# Patient Record
Sex: MALE | Race: WHITE | Employment: OTHER | URBAN - METROPOLITAN AREA
[De-identification: names, ages, dates, MRNs, and addresses within clinical notes are randomized per-mention and may not be internally consistent; named-entity substitution may affect disease eponyms.]

---

## 2020-01-24 ENCOUNTER — HOSPITAL ENCOUNTER (EMERGENCY)
Age: 60
Discharge: HOME | End: 2020-01-24
Attending: EMERGENCY MEDICINE
Payer: COMMERCIAL

## 2020-01-24 ENCOUNTER — APPOINTMENT (OUTPATIENT)
Dept: GENERAL RADIOLOGY | Age: 60
End: 2020-01-24
Payer: COMMERCIAL

## 2020-01-24 VITALS
BODY MASS INDEX: 27.35 KG/M2 | TEMPERATURE: 97.9 F | WEIGHT: 191 LBS | OXYGEN SATURATION: 95 % | DIASTOLIC BLOOD PRESSURE: 83 MMHG | HEIGHT: 70 IN | SYSTOLIC BLOOD PRESSURE: 142 MMHG | RESPIRATION RATE: 17 BRPM | HEART RATE: 83 BPM

## 2020-01-24 LAB
ANION GAP SERPL CALCULATED.3IONS-SCNC: 12 MMOL/L (ref 7–16)
BUN BLDV-MCNC: 16 MG/DL (ref 6–20)
CALCIUM SERPL-MCNC: 9.4 MG/DL (ref 8.6–10.2)
CHLORIDE BLD-SCNC: 101 MMOL/L (ref 98–107)
CO2: 25 MMOL/L (ref 22–29)
CREAT SERPL-MCNC: 1.3 MG/DL (ref 0.7–1.2)
EKG ATRIAL RATE: 79 BPM
EKG P AXIS: 6 DEGREES
EKG P-R INTERVAL: 130 MS
EKG Q-T INTERVAL: 380 MS
EKG QRS DURATION: 84 MS
EKG QTC CALCULATION (BAZETT): 435 MS
EKG R AXIS: 13 DEGREES
EKG T AXIS: -9 DEGREES
EKG VENTRICULAR RATE: 79 BPM
GFR AFRICAN AMERICAN: >60
GFR NON-AFRICAN AMERICAN: 57 ML/MIN/1.73
GLUCOSE BLD-MCNC: 181 MG/DL (ref 74–99)
HCT VFR BLD CALC: 42.1 % (ref 37–54)
HEMOGLOBIN: 14.1 G/DL (ref 12.5–16.5)
MCH RBC QN AUTO: 30 PG (ref 26–35)
MCHC RBC AUTO-ENTMCNC: 33.5 % (ref 32–34.5)
MCV RBC AUTO: 89.6 FL (ref 80–99.9)
PDW BLD-RTO: 12.3 FL (ref 11.5–15)
PLATELET # BLD: 212 E9/L (ref 130–450)
PMV BLD AUTO: 10.5 FL (ref 7–12)
POTASSIUM SERPL-SCNC: 3.5 MMOL/L (ref 3.5–5)
RBC # BLD: 4.7 E12/L (ref 3.8–5.8)
SODIUM BLD-SCNC: 138 MMOL/L (ref 132–146)
TROPONIN: <0.01 NG/ML (ref 0–0.03)
WBC # BLD: 7.4 E9/L (ref 4.5–11.5)

## 2020-01-24 PROCEDURE — 84484 ASSAY OF TROPONIN QUANT: CPT

## 2020-01-24 PROCEDURE — 99285 EMERGENCY DEPT VISIT HI MDM: CPT

## 2020-01-24 PROCEDURE — 85027 COMPLETE CBC AUTOMATED: CPT

## 2020-01-24 PROCEDURE — 93005 ELECTROCARDIOGRAM TRACING: CPT | Performed by: EMERGENCY MEDICINE

## 2020-01-24 PROCEDURE — 36415 COLL VENOUS BLD VENIPUNCTURE: CPT

## 2020-01-24 PROCEDURE — 80048 BASIC METABOLIC PNL TOTAL CA: CPT

## 2020-01-24 PROCEDURE — 71045 X-RAY EXAM CHEST 1 VIEW: CPT

## 2020-01-24 RX ORDER — LORAZEPAM 0.5 MG/1
1 TABLET ORAL EVERY 6 HOURS PRN
COMMUNITY

## 2020-01-24 ASSESSMENT — ENCOUNTER SYMPTOMS
DIARRHEA: 0
ABDOMINAL PAIN: 0
EYE DISCHARGE: 0
COUGH: 0
EYE PAIN: 0
SORE THROAT: 0
EYE REDNESS: 0
SHORTNESS OF BREATH: 0
WHEEZING: 0
NAUSEA: 0
VOMITING: 0
BACK PAIN: 0
SINUS PRESSURE: 0

## 2020-01-24 NOTE — ED PROVIDER NOTES
Normal breath sounds. No stridor. No wheezing, rhonchi or rales. Abdominal:      General: Bowel sounds are normal. There is no distension. Palpations: Abdomen is soft. Tenderness: There is no tenderness. There is no guarding. Musculoskeletal: Normal range of motion. General: No swelling. Skin:     General: Skin is warm and dry. Findings: No rash. Neurological:      Mental Status: He is alert and oriented to person, place, and time. Procedures     MDM         EKG:  Normal sinus rhythm with ventricular rate of 79 and frequent PVC's. [place] interval, QRS duration and QT interval within normal range. Normal axis. Nonspecific ST segment and T wave changes. No previous EKG.         --------------------------------------------- PAST HISTORY ---------------------------------------------  Past Medical History:  has no past medical history on file. Past Surgical History:  has no past surgical history on file. Social History:      Family History: family history is not on file. The patients home medications have been reviewed. Allergies: Patient has no known allergies.     -------------------------------------------------- RESULTS -------------------------------------------------  Labs:  Results for orders placed or performed during the hospital encounter of 26/56/02   Basic metabolic panel   Result Value Ref Range    Sodium 138 132 - 146 mmol/L    Potassium 3.5 3.5 - 5.0 mmol/L    Chloride 101 98 - 107 mmol/L    CO2 25 22 - 29 mmol/L    Anion Gap 12 7 - 16 mmol/L    Glucose 181 (H) 74 - 99 mg/dL    BUN 16 6 - 20 mg/dL    CREATININE 1.3 (H) 0.7 - 1.2 mg/dL    GFR Non-African American 57 >=60 mL/min/1.73    GFR African American >60     Calcium 9.4 8.6 - 10.2 mg/dL   CBC   Result Value Ref Range    WBC 7.4 4.5 - 11.5 E9/L    RBC 4.70 3.80 - 5.80 E12/L    Hemoglobin 14.1 12.5 - 16.5 g/dL    Hematocrit 42.1 37.0 - 54.0 %    MCV 89.6 80.0 - 99.9 fL    MCH 30.0 26.0 - 35.0 pg    MCHC Premature ventricular contractions        Disposition:  Patient's disposition: Discharge to home  Patient's condition is stable.                Evonne Hathaway DO  01/24/20 0392

## 2020-02-05 ENCOUNTER — HOSPITAL ENCOUNTER (OUTPATIENT)
Age: 60
Discharge: HOME | End: 2020-02-07
Payer: COMMERCIAL

## 2020-02-05 LAB
ALBUMIN SERPL-MCNC: 4.9 G/DL (ref 3.5–5.2)
ALP BLD-CCNC: 48 U/L (ref 40–129)
ALT SERPL-CCNC: 17 U/L (ref 0–40)
ANION GAP SERPL CALCULATED.3IONS-SCNC: 14 MMOL/L (ref 7–16)
AST SERPL-CCNC: 22 U/L (ref 0–39)
BILIRUB SERPL-MCNC: 0.3 MG/DL (ref 0–1.2)
BUN BLDV-MCNC: 14 MG/DL (ref 6–20)
CALCIUM SERPL-MCNC: 9.9 MG/DL (ref 8.6–10.2)
CHLORIDE BLD-SCNC: 102 MMOL/L (ref 98–107)
CO2: 23 MMOL/L (ref 22–29)
CREAT SERPL-MCNC: 1.3 MG/DL (ref 0.7–1.2)
GFR AFRICAN AMERICAN: >60
GFR NON-AFRICAN AMERICAN: 57 ML/MIN/1.73
GLUCOSE BLD-MCNC: 156 MG/DL (ref 74–99)
HBA1C MFR BLD: 5.6 % (ref 4–5.6)
POTASSIUM SERPL-SCNC: 4.5 MMOL/L (ref 3.5–5)
PROSTATE SPECIFIC ANTIGEN: 0.58 NG/ML (ref 0–4)
SODIUM BLD-SCNC: 139 MMOL/L (ref 132–146)
T3 FREE: 2.9 PG/ML (ref 2–4.4)
T3 TOTAL: 88.74 NG/DL (ref 80–200)
T3 UPTAKE PERCENT: 33.2 % (ref 22.5–37)
T4 FREE: 1.38 NG/DL (ref 0.93–1.7)
T4 TOTAL: 6.6 MCG/DL (ref 4.5–11.7)
TOTAL PROTEIN: 7.8 G/DL (ref 6.4–8.3)
TSH SERPL DL<=0.05 MIU/L-ACNC: 1.57 UIU/ML (ref 0.27–4.2)

## 2020-02-05 PROCEDURE — 80053 COMPREHEN METABOLIC PANEL: CPT

## 2020-02-05 PROCEDURE — 84479 ASSAY OF THYROID (T3 OR T4): CPT

## 2020-02-05 PROCEDURE — 83036 HEMOGLOBIN GLYCOSYLATED A1C: CPT

## 2020-02-05 PROCEDURE — 84439 ASSAY OF FREE THYROXINE: CPT

## 2020-02-05 PROCEDURE — G0103 PSA SCREENING: HCPCS

## 2020-02-05 PROCEDURE — 84443 ASSAY THYROID STIM HORMONE: CPT

## 2020-02-05 PROCEDURE — 84481 FREE ASSAY (FT-3): CPT

## 2020-02-08 NOTE — PROGRESS NOTES
[facility] Cardiology consult  Dr. Alejandra Pierce      Reason for Consult: Post ER  Referring Physician: No primary care provider on file. CHIEF COMPLAINT:   Chief Complaint   Patient presents with    Palpitations     post ER. Pt was in ER for Palpitations. No complains since. HISTORY OF PRESENT ILLNESS:   Patient is 63years old male with no significant past medical history, had palpitations, patient is here for further evaluation. Patient stated that couple of weeks ago he had an episode of palpitations that lasted for a couple of days, off and on, no aggravating or alleviating factors, no associated symptoms, symptoms prompted him to seek medical attention at the emergency room where he was diagnosed with a PVCs, patient stated for the last few days he did not have any palpitations, palpitations happen at rest, better with exertion, denies any lightheadedness or dizziness, no chest pain, no shortness of breath, no pedal edema, no PND, no orthopnea, no syncope, no presyncopal episodes. Patient is physically active here, he does a spinning bike classes for at least 45 minutes almost every day without any cardiac complaints      History reviewed. No pertinent past medical history. Past Surgical History:   Procedure Laterality Date    EYE SURGERY Left          Current Outpatient Medications   Medication Sig Dispense Refill    LORazepam (ATIVAN) 0.5 MG tablet Take 0.5 mg by mouth every 6 hours as needed for Anxiety. No current facility-administered medications for this visit.           Allergies as of 02/11/2020    (No Known Allergies)       Social History     Socioeconomic History    Marital status: Single     Spouse name: Not on file    Number of children: Not on file    Years of education: Not on file    Highest education level: Not on file   Occupational History    Not on file   Social Needs    Financial resource strain: Not on file    Food insecurity:     Worry: Not on file Inability: Not on file    Transportation needs:     Medical: Not on file     Non-medical: Not on file   Tobacco Use    Smoking status: Never Smoker    Smokeless tobacco: Never Used   Substance and Sexual Activity    Alcohol use: Yes     Comment: occasional.     Drug use: Never    Sexual activity: Not on file   Lifestyle    Physical activity:     Days per week: Not on file     Minutes per session: Not on file    Stress: Not on file   Relationships    Social connections:     Talks on phone: Not on file     Gets together: Not on file     Attends Mosque service: Not on file     Active member of club or organization: Not on file     Attends meetings of clubs or organizations: Not on file     Relationship status: Not on file    Intimate partner violence:     Fear of current or ex partner: Not on file     Emotionally abused: Not on file     Physically abused: Not on file     Forced sexual activity: Not on file   Other Topics Concern    Not on file   Social History Narrative    Not on file       Family History   Problem Relation Age of Onset    High Blood Pressure Mother     Heart Failure Father     Heart Surgery Father        REVIEW OF SYSTEMS:     CONSTITUTIONAL:  negative for  fevers, chills, sweats and fatigue  EYES:  negative for  double vision, blurred vision and blind spots  HEENT:  negative for  tinnitus, earaches, nasal congestion and epistaxis  RESPIRATORY:  negative for  dry cough, cough with sputum, dyspnea, wheezing and hemoptysis  CARDIOVASCULAR: as per HPI  GASTROINTESTINAL:  negative for nausea, vomiting, diarrhea, constipation, pruritus and jaundice  GENITOURINARY:  negative for frequency, dysuria, nocturia, urinary incontinence and hesitancy  HEMATOLOGIC/LYMPHATIC:  negative for easy bruising, bleeding, lymphadenopathy and petechiae  ALLERGIC/IMMUNOLOGIC:  negative for urticaria, hay fever and angioedema  ENDOCRINE:  negative for heat intolerance, cold intolerance, tremor, hair loss and diabetic symptoms including neither polyuria nor polydipsia nor blurred vision  MUSCULOSKELETAL:  negative for  myalgias, arthralgias, joint swelling, stiff joints and decreased range of motion  NEUROLOGICAL:  negative for memory problems, speech problems, visual disturbance, dysphagia, weakness and numbness      PHYSICAL EXAM:   CONSTITUTIONAL:  awake, alert, cooperative, no apparent distress, and appears stated age  EYES:  lids and lashes normal and pupils equal, round and reactive to light, anicteric sclerae  HEAD:  normocepalic, without obvious abnormality, atraumatic, pink, moist mucous membranes. NECK:  Supple, symmetrical, trachea midline, no adenopathy, thyroid symmetric, not enlarged and no tenderness, skin normal  HEMATOLOGIC/LYMPHATICS:  no cervical lymphadenopathy and no supraclavicular lymphadenopathy  LUNGS:  No increased work of breathing, good air exchange, clear to auscultation bilaterally, no crackles or wheezing  CARDIOVASCULAR:  Normal apical impulse, regular rate and rhythm, normal S1 and S2, no S3 or S4, and no murmur noted and no JVD, no carotid bruit, no pedal edema, good carotid upstroke bilaterally. ABDOMEN:  Soft, nontender, no masses, no hepatomegaly or splenomegaly, BS+  CHEST: nontender to palpation, expands symmetrically  MUSCULOSKELETAL:  No clubbing no cyanosis. there is no redness, warmth, or swelling of the joints  full range of motion noted  NEUROLOGIC:  Alert, awake,oriented x3, no focal neurologic deficit was appreciated  SKIN:  no bruising or bleeding, normal skin color, texture, turgor and no redness, warmth, or swelling        /70 (Site: Right Upper Arm, Position: Sitting, Cuff Size: Large Adult)   Pulse 70   Ht 5' 10\" (1.778 m)   Wt 196 lb (88.9 kg)   BMI 28.12 kg/m²     DATA:  I personally reviewed the visit EKG with the following interpretation: Sinus rhythm nonspecific T wave changes      EKG 1/24/20 Sinus rhythm with occasional premature ventricular

## 2020-02-11 ENCOUNTER — OFFICE VISIT (OUTPATIENT)
Dept: CARDIOLOGY CLINIC | Age: 60
End: 2020-02-11
Payer: COMMERCIAL

## 2020-02-11 VITALS
HEART RATE: 70 BPM | BODY MASS INDEX: 28.06 KG/M2 | WEIGHT: 196 LBS | DIASTOLIC BLOOD PRESSURE: 70 MMHG | SYSTOLIC BLOOD PRESSURE: 130 MMHG | HEIGHT: 70 IN

## 2020-02-11 PROCEDURE — 99203 OFFICE O/P NEW LOW 30 MIN: CPT | Performed by: INTERNAL MEDICINE

## 2020-02-11 PROCEDURE — 93000 ELECTROCARDIOGRAM COMPLETE: CPT | Performed by: INTERNAL MEDICINE

## 2020-11-20 ENCOUNTER — HOSPITAL ENCOUNTER (EMERGENCY)
Age: 60
Discharge: HOME | End: 2020-11-20
Attending: EMERGENCY MEDICINE
Payer: COMMERCIAL

## 2020-11-20 ENCOUNTER — APPOINTMENT (OUTPATIENT)
Dept: GENERAL RADIOLOGY | Age: 60
End: 2020-11-20
Payer: COMMERCIAL

## 2020-11-20 VITALS
BODY MASS INDEX: 25.77 KG/M2 | WEIGHT: 180 LBS | TEMPERATURE: 98 F | RESPIRATION RATE: 18 BRPM | HEIGHT: 70 IN | SYSTOLIC BLOOD PRESSURE: 158 MMHG | OXYGEN SATURATION: 98 % | DIASTOLIC BLOOD PRESSURE: 86 MMHG | HEART RATE: 64 BPM

## 2020-11-20 LAB
ANION GAP SERPL CALCULATED.3IONS-SCNC: 11 MMOL/L (ref 7–16)
BASOPHILS ABSOLUTE: 0.04 E9/L (ref 0–0.2)
BASOPHILS RELATIVE PERCENT: 0.5 % (ref 0–2)
BUN BLDV-MCNC: 13 MG/DL (ref 6–20)
CALCIUM SERPL-MCNC: 10 MG/DL (ref 8.6–10.2)
CHLORIDE BLD-SCNC: 102 MMOL/L (ref 98–107)
CO2: 26 MMOL/L (ref 22–29)
CREAT SERPL-MCNC: 1.3 MG/DL (ref 0.7–1.2)
EKG ATRIAL RATE: 69 BPM
EKG P AXIS: 60 DEGREES
EKG P-R INTERVAL: 166 MS
EKG Q-T INTERVAL: 362 MS
EKG QRS DURATION: 78 MS
EKG QTC CALCULATION (BAZETT): 387 MS
EKG R AXIS: 31 DEGREES
EKG T AXIS: 18 DEGREES
EKG VENTRICULAR RATE: 69 BPM
EOSINOPHILS ABSOLUTE: 0.04 E9/L (ref 0.05–0.5)
EOSINOPHILS RELATIVE PERCENT: 0.5 % (ref 0–6)
GFR AFRICAN AMERICAN: >60
GFR NON-AFRICAN AMERICAN: 56 ML/MIN/1.73
GLUCOSE BLD-MCNC: 125 MG/DL (ref 74–99)
HCT VFR BLD CALC: 45.9 % (ref 37–54)
HEMOGLOBIN: 15.4 G/DL (ref 12.5–16.5)
IMMATURE GRANULOCYTES #: 0.01 E9/L
IMMATURE GRANULOCYTES %: 0.1 % (ref 0–5)
LYMPHOCYTES ABSOLUTE: 1.74 E9/L (ref 1.5–4)
LYMPHOCYTES RELATIVE PERCENT: 23.8 % (ref 20–42)
MCH RBC QN AUTO: 29.7 PG (ref 26–35)
MCHC RBC AUTO-ENTMCNC: 33.6 % (ref 32–34.5)
MCV RBC AUTO: 88.6 FL (ref 80–99.9)
MONOCYTES ABSOLUTE: 0.6 E9/L (ref 0.1–0.95)
MONOCYTES RELATIVE PERCENT: 8.2 % (ref 2–12)
NEUTROPHILS ABSOLUTE: 4.88 E9/L (ref 1.8–7.3)
NEUTROPHILS RELATIVE PERCENT: 66.9 % (ref 43–80)
PDW BLD-RTO: 12.7 FL (ref 11.5–15)
PLATELET # BLD: 235 E9/L (ref 130–450)
PMV BLD AUTO: 10.9 FL (ref 7–12)
POTASSIUM SERPL-SCNC: 4.5 MMOL/L (ref 3.5–5)
RBC # BLD: 5.18 E12/L (ref 3.8–5.8)
SODIUM BLD-SCNC: 139 MMOL/L (ref 132–146)
T4 TOTAL: 7.7 MCG/DL (ref 4.5–11.7)
TROPONIN: <0.01 NG/ML (ref 0–0.03)
TSH SERPL DL<=0.05 MIU/L-ACNC: 1.57 UIU/ML (ref 0.27–4.2)
WBC # BLD: 7.3 E9/L (ref 4.5–11.5)

## 2020-11-20 PROCEDURE — 80048 BASIC METABOLIC PNL TOTAL CA: CPT

## 2020-11-20 PROCEDURE — 6370000000 HC RX 637 (ALT 250 FOR IP): Performed by: STUDENT IN AN ORGANIZED HEALTH CARE EDUCATION/TRAINING PROGRAM

## 2020-11-20 PROCEDURE — 84443 ASSAY THYROID STIM HORMONE: CPT

## 2020-11-20 PROCEDURE — 93010 ELECTROCARDIOGRAM REPORT: CPT | Performed by: INTERNAL MEDICINE

## 2020-11-20 PROCEDURE — 99283 EMERGENCY DEPT VISIT LOW MDM: CPT

## 2020-11-20 PROCEDURE — 84484 ASSAY OF TROPONIN QUANT: CPT

## 2020-11-20 PROCEDURE — 93005 ELECTROCARDIOGRAM TRACING: CPT | Performed by: PHYSICIAN ASSISTANT

## 2020-11-20 PROCEDURE — 36415 COLL VENOUS BLD VENIPUNCTURE: CPT

## 2020-11-20 PROCEDURE — 71046 X-RAY EXAM CHEST 2 VIEWS: CPT

## 2020-11-20 PROCEDURE — 84436 ASSAY OF TOTAL THYROXINE: CPT

## 2020-11-20 PROCEDURE — 85025 COMPLETE CBC W/AUTO DIFF WBC: CPT

## 2020-11-20 RX ORDER — METOPROLOL SUCCINATE 25 MG/1
25 TABLET, EXTENDED RELEASE ORAL ONCE
Status: COMPLETED | OUTPATIENT
Start: 2020-11-20 | End: 2020-11-20

## 2020-11-20 RX ADMIN — METOPROLOL SUCCINATE 25 MG: 25 TABLET, EXTENDED RELEASE ORAL at 17:19

## 2020-11-20 ASSESSMENT — ENCOUNTER SYMPTOMS
SHORTNESS OF BREATH: 0
DIARRHEA: 0
ABDOMINAL PAIN: 0
SORE THROAT: 0
NAUSEA: 0
COUGH: 0
BACK PAIN: 0
VOMITING: 0
WHEEZING: 0
RHINORRHEA: 0

## 2020-11-20 NOTE — ED NOTES
FIRST PROVIDER CONTACT ASSESSMENT NOTE      Department of Emergency Medicine   ED  First Provider Note   11/20/20  1:07 PM EST    Chief Complaint: Palpitations (intermittent for several months, hx anxiety-worse last several days)      History of Present Illness:    Bebeto Brown is a 62 y.o. male who presents to the ED by private car for palpitations and also complaining of some chest pain last night. Patient states that he had the same issue in January and was diagnosed with \"PVCs. \"    Focused Screening Exam:  Constitutional:  Alert, appears stated age and is in no distress. *ALLERGIES*     Patient has no known allergies.      ED Triage Vitals   BP Temp Temp Source Pulse Resp SpO2 Height Weight   11/20/20 1304 11/20/20 1245 11/20/20 1245 11/20/20 1304 11/20/20 1304 11/20/20 1304 11/20/20 1304 11/20/20 1304   (!) 143/90 97.1 °F (36.2 °C) Tympanic 88 20 99 % 5' 10\" (1.778 m) 180 lb (81.6 kg)        Initial Plan of Care:  Initiate Treatment-Testing, Proceed toTreatment Area When Bed Available for ED Attending/MLP to Continue Care    -----------------[address] ASSESSMENT NOTE--------------  Electronically signed by Nathan Navarro PA-C   DD: 11/20/20       Nathan Navarro PA-C  11/20/20 1308

## 2020-11-20 NOTE — ED NOTES
Discharge instructions with Rx reviewed, patient verbalized understanding. Provided small snack for patient prior to leaving.           Teodoro Sultana RN  11/20/20 0933

## 2020-11-20 NOTE — ED PROVIDER NOTES
[facility]  Department of Emergency Medicine     Written by: Myra Gee DO  Patient Name: Cassi Onofre  Attending Provider: Ambreen att. providers found  Admit Date: 2020  1:19 PM  MRN: [id number]                   : 1960        Chief Complaint   Patient presents with    Palpitations     intermittent for several months, hx anxiety-worse last several days    - Chief complaint    The history is provided by the patient and medical records. Patient is a 69-year-old male who presents to the ED due to palpitations ongoing for the past x2 months. He does have a history of these, but before the last 2 months they were much more infrequent; he was seen in this ED in January of this year for the same symptoms, found to have frequent PVCs on EKG. Subsequently seen by Dr. Severiano Hoe (cardiology), his PVCs at that time were resolved, was told to call if symptoms recur so they could place him on Holter monitor for further evaluation; also placed on preventive cardiology and told to follow up in 6 months (or less if symptoms recurred). Patient denies any associated SOB, chest pain, dizziness, lightheadedness, nausea, or vomiting. Review of Systems   Constitutional: Negative for chills, fatigue and fever. HENT: Negative for congestion, rhinorrhea and sore throat. Eyes: Negative for visual disturbance. Respiratory: Negative for cough, shortness of breath and wheezing. Cardiovascular: Positive for palpitations. Negative for chest pain and leg swelling. Gastrointestinal: Negative for abdominal pain, diarrhea, nausea and vomiting. Genitourinary: Negative for dysuria and frequency. Musculoskeletal: Negative for back pain and myalgias. Skin: Negative for rash and wound. Neurological: Negative for dizziness, weakness, light-headedness and headaches. Psychiatric/Behavioral: Negative for confusion. Physical Exam  Vitals signs and nursing note reviewed. Constitutional:       General: He is not in acute distress. Appearance: He is not ill-appearing. HENT:      Head: Normocephalic and atraumatic. Right Ear: External ear normal.      Left Ear: External ear normal.      Nose: Nose normal. No rhinorrhea. Mouth/Throat:      Mouth: Mucous membranes are moist.      Pharynx: Oropharynx is clear. Eyes:      Extraocular Movements: Extraocular movements intact. Conjunctiva/sclera: Conjunctivae normal.      Pupils: Pupils are equal, round, and reactive to light. Neck:      Musculoskeletal: Normal range of motion and neck supple. Cardiovascular:      Rate and Rhythm: Normal rate. Rhythm irregular. Pulses: Normal pulses. Heart sounds: Normal heart sounds. Pulmonary:      Effort: Pulmonary effort is normal. No respiratory distress. Breath sounds: Normal breath sounds. No wheezing or rales. Abdominal:      General: Bowel sounds are normal.      Palpations: Abdomen is soft. Tenderness: There is no abdominal tenderness. There is no guarding. Musculoskeletal: Normal range of motion. General: No tenderness. Right lower leg: No edema. Left lower leg: No edema. Skin:     General: Skin is warm and dry. Capillary Refill: Capillary refill takes less than 2 seconds. Neurological:      General: No focal deficit present. Mental Status: He is alert and oriented to person, place, and time. Sensory: No sensory deficit. Psychiatric:         Mood and Affect: Mood normal.         Behavior: Behavior normal.          Procedures       MDM     52-year-old male presents to the ED due to chest palpitations x2 months, he does have a history the symptoms with PVCs on previous EKG but they are recently worsened and unremitting. Denies any chest pain or shortness of breath. Follows with Dr. Jordyn Ball. In the ED today patient is stable, no acute distress, does endorse current symptoms.   Exam does show an irregular rhythm, normal rate; patient also appears anxious but is pleasant, alert and oriented and answers questions appropriately. EKG showed sinus rhythm with frequent PVCs. Work-up including CXR, CBC, BMP, troponin, and thyroid studies was otherwise unremarkable. Patient did remain symptomatic throughout this encounter. Spoke with Dr. Ottoniel Carpenter (patient's cardiologist), discussed case, he recommends starting this patient on metoprolol and having him follow-up as an outpatient by calling the office on Monday. Discussed results and plan in their entirety with the patient, he is amenable and his questions were answered at this time. Patient remained hemodynamically stable throughout this encounter. He was made aware of signs/symptoms for which to return to the ER. I have discussed this patient with my attending, who has seen the patient and agrees with this disposition. Patient was seen and evaluated by myself and my attending No att. providers found. Assessment and Plan discussed with attending provider, please see attestation for final plan of care. ED Course as of Nov 20 1943 Fri Nov 20, 2020   [id number]   ATTENDING PROVIDER ATTESTATION:     I have personally performed and/or participated in the history, exam, medical decision making, and procedures and agree with all pertinent clinical information unless otherwise noted. I have also reviewed and agree with the past medical, family and social history unless otherwise noted. I have discussed this patient in detail with the resident and provided the instruction and education regarding the evidence-based evaluation and treatment of [unfilled]  History: patient presents with 2 months of constant palpitations. He states it disrupts his sleeping and increases his anxiety. He denies CP, SOB, nausea or diaphoresis. My findings: Bebeto Brown is a 62 y.o. male whom is in no distress. Physical exam reveals heart RR with occasional ectopy.   Abdomen is Granulocytes % 0.1 0.0 - 5.0 %    Lymphocytes % 23.8 20.0 - 42.0 %    Monocytes % 8.2 2.0 - 12.0 %    Eosinophils % 0.5 0.0 - 6.0 %    Basophils % 0.5 0.0 - 2.0 %    Neutrophils Absolute 4.88 1.80 - 7.30 E9/L    Immature Granulocytes # 0.01 E9/L    Lymphocytes Absolute 1.74 1.50 - 4.00 E9/L    Monocytes Absolute 0.60 0.10 - 0.95 E9/L    Eosinophils Absolute 0.04 (L) 0.05 - 0.50 E9/L    Basophils Absolute 0.04 0.00 - 0.20 E9/L   Troponin   Result Value Ref Range    Troponin <0.01 0.00 - 0.03 ng/mL   Basic Metabolic Panel   Result Value Ref Range    Sodium 139 132 - 146 mmol/L    Potassium 4.5 3.5 - 5.0 mmol/L    Chloride 102 98 - 107 mmol/L    CO2 26 22 - 29 mmol/L    Anion Gap 11 7 - 16 mmol/L    Glucose 125 (H) 74 - 99 mg/dL    BUN 13 6 - 20 mg/dL    CREATININE 1.3 (H) 0.7 - 1.2 mg/dL    GFR Non-African American 56 >=60 mL/min/1.73    GFR African American >60     Calcium 10.0 8.6 - 10.2 mg/dL   T4   Result Value Ref Range    T4, Total 7.7 4.5 - 11.7 mcg/dL   TSH without Reflex   Result Value Ref Range    TSH 1.570 0.270 - 4.200 uIU/mL   EKG 12 Lead   Result Value Ref Range    Ventricular Rate 69 BPM    Atrial Rate 69 BPM    P-R Interval 166 ms    QRS Duration 78 ms    Q-T Interval 362 ms    QTc Calculation (Bazett) 387 ms    P Axis 60 degrees    R Axis 31 degrees    T Axis 18 degrees       Radiology:  XR CHEST (2 VW)   Final Result   No acute process. EKG:  This EKG is signed and interpreted by the emergency department physician. Rate: 69  Rhythm: Sinus  Interpretation: Sinus rhythm with frequent PVCs, normal axis, normal [place] interval, normal QRS duration, no QT prolongation, no ST elevations  Comparison: changes compared to previous EKG      ------------------------- NURSING NOTES AND VITALS REVIEWED ---------------------------  Date / Time Roomed:  11/20/2020  1:19 PM  ED Bed Assignment:  20/20    The nursing notes within the ED encounter and vital signs as below have been reviewed.    BP (!) 158/86 Pulse 64   Temp 98 °F (36.7 °C) (Oral)   Resp 18   Ht 5' 10\" (1.778 m)   Wt 180 lb (81.6 kg)   SpO2 98%   BMI 25.83 kg/m²   Oxygen Saturation Interpretation: Normal      ------------------------------------------ PROGRESS NOTES ------------------------------------------  3:24 PM EST  I have spoken with the patient and discussed todays results, in addition to providing specific details for the plan of care and counseling regarding the diagnosis and prognosis. Their questions are answered at this time and they are agreeable with the plan. I discussed at length with them reasons for immediate return here for re evaluation. They will followup with their cardiologist and primary care physician by calling their office on Monday.      --------------------------------- ADDITIONAL PROVIDER NOTES ---------------------------------  At this time the patient is without objective evidence of an acute process requiring hospitalization or inpatient management. They have remained hemodynamically stable throughout their entire ED visit and are stable for discharge with outpatient follow-up. The plan has been discussed in detail and they are aware of the specific conditions for emergent return, as well as the importance of follow-up. Discharge Medication List as of 11/20/2020  5:28 PM      START taking these medications    Details   metoprolol tartrate (LOPRESSOR) 25 MG tablet Take 1 tablet by mouth daily, Disp-30 tablet,R-0Print             Diagnosis:   1. Palpitations    2. Multiple premature ventricular complexes        Disposition:  Patient's disposition: Discharge to home  Patient's condition is stable.          Sheila Hauser DO  Resident  11/20/20 3434

## 2020-12-21 NOTE — PROGRESS NOTES
[facility] Cardiology consult  Dr. Chris Joiner      Reason for Consult: Post ER  Referring Physician: Artur Weiner DO     CHIEF COMPLAINT:   Chief Complaint   Patient presents with    Palpitations     post hosp for palpitations. Pt complains of frequent palpitations. no other cardiac complaints today    Other     pt was given metoprolol in hosp. currently not taking due to causing fatigue, depression, low HR. HISTORY OF PRESENT ILLNESS:   Patient is 62years old male with no significant past medical history, had palpitations, patient is here for further evaluation. Patient has been having palpitations for the last couple of months, he had them earlier this year but they went away for a few months and came back couple of months ago started to gradually, have been persistent, the other day symptoms were significant enough that prompted him to seek medical attention at the emergency room, no associated symptoms, no lightheadedness or dizziness, no significant elevating or aggravating factors, he was prescribed metoprolol at the emergency room but he stopped it due to fatigue, denies any pedal edema, no PND, no orthopnea, no syncope, no presyncopal episodes. Patient is physically active here, he does a spinning bike classes for at least 45 minutes almost every day without any cardiac complaints      Past Medical History:   Diagnosis Date    Anxiety          Past Surgical History:   Procedure Laterality Date    EYE SURGERY Left          Current Outpatient Medications   Medication Sig Dispense Refill    LORazepam (ATIVAN) 0.5 MG tablet Take 1 mg by mouth every 6 hours as needed for Anxiety.  metoprolol tartrate (LOPRESSOR) 25 MG tablet Take 1 tablet by mouth daily (Patient not taking: Reported on 12/22/2020) 30 tablet 0     No current facility-administered medications for this visit.           Allergies as of 12/22/2020    (No Known Allergies)       Social History     Socioeconomic History  Marital status: Single     Spouse name: Not on file    Number of children: Not on file    Years of education: Not on file    Highest education level: Not on file   Occupational History    Not on file   Social Needs    Financial resource strain: Not on file    Food insecurity     Worry: Not on file     Inability: Not on file    Transportation needs     Medical: Not on file     Non-medical: Not on file   Tobacco Use    Smoking status: Never Smoker    Smokeless tobacco: Never Used   Substance and Sexual Activity    Alcohol use: Yes     Comment: occasional. denies caffiene    Drug use: Never    Sexual activity: Not on file   Lifestyle    Physical activity     Days per week: Not on file     Minutes per session: Not on file    Stress: Not on file   Relationships    Social connections     Talks on phone: Not on file     Gets together: Not on file     Attends Muslim service: Not on file     Active member of club or organization: Not on file     Attends meetings of clubs or organizations: Not on file     Relationship status: Not on file    Intimate partner violence     Fear of current or ex partner: Not on file     Emotionally abused: Not on file     Physically abused: Not on file     Forced sexual activity: Not on file   Other Topics Concern    Not on file   Social History Narrative    Not on file       Family History   Problem Relation Age of Onset    High Blood Pressure Mother     Heart Failure Father     Heart Surgery Father        REVIEW OF SYSTEMS:     CONSTITUTIONAL:  negative for  fevers, chills, sweats and fatigue  EYES:  negative for  double vision, blurred vision and blind spots  HEENT:  negative for  tinnitus, earaches, nasal congestion and epistaxis  RESPIRATORY:  negative for  dry cough, cough with sputum, dyspnea, wheezing and hemoptysis  CARDIOVASCULAR: as per HPI  GASTROINTESTINAL:  negative for nausea, vomiting, diarrhea, constipation, pruritus and jaundice GENITOURINARY:  negative for frequency, dysuria, nocturia, urinary incontinence and hesitancy  HEMATOLOGIC/LYMPHATIC:  negative for easy bruising, bleeding, lymphadenopathy and petechiae  ALLERGIC/IMMUNOLOGIC:  negative for urticaria, hay fever and angioedema  ENDOCRINE:  negative for heat intolerance, cold intolerance, tremor, hair loss and diabetic symptoms including neither polyuria nor polydipsia nor blurred vision  MUSCULOSKELETAL:  negative for  myalgias, arthralgias, joint swelling, stiff joints and decreased range of motion  NEUROLOGICAL:  negative for memory problems, speech problems, visual disturbance, dysphagia, weakness and numbness      PHYSICAL EXAM:   CONSTITUTIONAL:  awake, alert, cooperative, no apparent distress, and appears stated age  EYES:  lids and lashes normal and pupils equal, round and reactive to light, anicteric sclerae  HEAD:  normocepalic, without obvious abnormality, atraumatic, pink, moist mucous membranes. NECK:  Supple, symmetrical, trachea midline, no adenopathy, thyroid symmetric, not enlarged and no tenderness, skin normal  HEMATOLOGIC/LYMPHATICS:  no cervical lymphadenopathy and no supraclavicular lymphadenopathy  LUNGS:  No increased work of breathing, good air exchange, clear to auscultation bilaterally, no crackles or wheezing  CARDIOVASCULAR:  Normal apical impulse, regular rate and rhythm, normal S1 and S2, no S3 or S4, and no murmur noted and no JVD, no carotid bruit, no pedal edema, good carotid upstroke bilaterally. ABDOMEN:  Soft, nontender, no masses, no hepatomegaly or splenomegaly, BS+  CHEST: nontender to palpation, expands symmetrically  MUSCULOSKELETAL:  No clubbing no cyanosis. there is no redness, warmth, or swelling of the joints  full range of motion noted  NEUROLOGIC:  Alert, awake,oriented x3, no focal neurologic deficit was appreciated  SKIN:  no bruising or bleeding, normal skin color, texture, turgor and no redness, warmth, or swelling BP (!) 162/84 (Site: Right Upper Arm, Position: Sitting, Cuff Size: Large Adult)   Pulse 68   Ht 5' 10\" (1.778 m)   Wt 184 lb (83.5 kg)   BMI 26.40 kg/m²     DATA:  I personally reviewed the visit EKG with the following interpretation: Sinus rhythm, frequent PVCs, nonspecific T wave changes    EKG 11/20/20 Sinus rhythm with frequent premature ventricular complexes  Otherwise normal ECG  When compared with ECG of 24-JAN-2020 03:41,  Nonspecific T wave abnormality, improved in Inferior leads  Nonspecific T wave abnormality no longer evident in Anterolateral leads  QT has shortened    ECHO: Not performed to date  Stress Test: Not performed to date  Angiography: Not performed to date  Cardiology Labs: BMP:    Lab Results   Component Value Date     11/20/2020    K 4.5 11/20/2020     11/20/2020    CO2 26 11/20/2020    BUN 13 11/20/2020    CREATININE 1.3 11/20/2020     CMP:    Lab Results   Component Value Date     11/20/2020    K 4.5 11/20/2020     11/20/2020    CO2 26 11/20/2020    BUN 13 11/20/2020    CREATININE 1.3 11/20/2020    PROT 7.8 02/05/2020     CBC:    Lab Results   Component Value Date    WBC 7.3 11/20/2020    RBC 5.18 11/20/2020    HGB 15.4 11/20/2020    HCT 45.9 11/20/2020    MCV 88.6 11/20/2020    RDW 12.7 11/20/2020     11/20/2020     PT/INR:  No results found for: PTINR  PT/INR Warfarin:  No components found for: PTPATWAR, PTINRWAR  PTT:  No results found for: APTT  PTT Heparin:  No components found for: APTTHEP  Magnesium:  No results found for: MG  TSH:    Lab Results   Component Value Date    TSH 1.570 11/20/2020     TROPONIN:  No components found for: TROP  BNP:  No results found for: BNP  FASTING LIPID PANEL:  No results found for: CHOL, HDL, TRIG  No orders to display     I have personally reviewed the laboratory, cardiac diagnostic and radiographic testing as outlined above:    Records from recent emergency room visit reviewed    IMPRESSION: 1.  Palpitations:most likely secondary to PVCs as evident by the findings noted on his monitor in the emergency room, patient is completely asymptomatic right now, will continue to follow. 2. PVCs:   3. Family history for early CAD    RECOMMENDATIONS:   1. Holter monitor  2. Patient was strongly advised to call 911 if symptoms recur or get worse for any reason  3. Preventive cardiology: Low-salt, low-cholesterol diet, daily exercise, were all advised. 4.  Follow-up with PCP as scheduled  5. Follow-up with Dr. Teri Nogueira after his Holter monitor    I have reviewed my findings and recommendations with patient    Electronically signed by Blayne Herrera MD on 1/1/2021 at 1:50 PM  NOTE: This report was transcribed using voice recognition software.  Every effort was made to ensure accuracy; however, inadvertent computerized transcription errors may be present

## 2020-12-22 ENCOUNTER — OFFICE VISIT (OUTPATIENT)
Dept: CARDIOLOGY CLINIC | Age: 60
End: 2020-12-22
Payer: COMMERCIAL

## 2020-12-22 VITALS
SYSTOLIC BLOOD PRESSURE: 162 MMHG | BODY MASS INDEX: 26.34 KG/M2 | DIASTOLIC BLOOD PRESSURE: 84 MMHG | HEART RATE: 68 BPM | HEIGHT: 70 IN | WEIGHT: 184 LBS

## 2020-12-22 DIAGNOSIS — R00.2 PALPITATIONS: Primary | ICD-10-CM

## 2020-12-22 PROCEDURE — 99214 OFFICE O/P EST MOD 30 MIN: CPT | Performed by: INTERNAL MEDICINE

## 2020-12-22 PROCEDURE — 1036F TOBACCO NON-USER: CPT | Performed by: INTERNAL MEDICINE

## 2020-12-22 PROCEDURE — 93000 ELECTROCARDIOGRAM COMPLETE: CPT | Performed by: INTERNAL MEDICINE

## 2020-12-22 PROCEDURE — G8427 DOCREV CUR MEDS BY ELIG CLIN: HCPCS | Performed by: INTERNAL MEDICINE

## 2020-12-22 PROCEDURE — G8419 CALC BMI OUT NRM PARAM NOF/U: HCPCS | Performed by: INTERNAL MEDICINE

## 2020-12-22 PROCEDURE — G8484 FLU IMMUNIZE NO ADMIN: HCPCS | Performed by: INTERNAL MEDICINE

## 2020-12-22 PROCEDURE — 3017F COLORECTAL CA SCREEN DOC REV: CPT | Performed by: INTERNAL MEDICINE

## 2020-12-22 SDOH — HEALTH STABILITY: MENTAL HEALTH: HOW OFTEN DO YOU HAVE A DRINK CONTAINING ALCOHOL?: NOT ASKED

## 2021-01-13 DIAGNOSIS — R94.31 ABNORMAL EKG: Primary | ICD-10-CM

## 2021-01-14 ENCOUNTER — TELEPHONE (OUTPATIENT)
Dept: CARDIOLOGY | Age: 61
End: 2021-01-14

## 2021-01-14 DIAGNOSIS — R00.2 PALPITATIONS: ICD-10-CM

## 2021-01-15 ENCOUNTER — HOSPITAL ENCOUNTER (OUTPATIENT)
Dept: CARDIOLOGY | Age: 61
Discharge: HOME | End: 2021-01-15
Payer: COMMERCIAL

## 2021-01-15 DIAGNOSIS — R94.31 ABNORMAL EKG: ICD-10-CM

## 2021-01-15 LAB
LV EF: 55 %
LVEF MODALITY: NORMAL

## 2021-01-15 PROCEDURE — 93306 TTE W/DOPPLER COMPLETE: CPT

## 2021-01-18 ENCOUNTER — TELEPHONE (OUTPATIENT)
Dept: CARDIOLOGY | Age: 61
End: 2021-01-18

## 2021-01-18 NOTE — TELEPHONE ENCOUNTER
Left message on voice mail reminding patient of regular stress test appointment on Jan. 19, 2021 at 1300. Instructions for test left on message. Asked patient to call office if not feeling well or with any questions.

## 2021-01-19 ENCOUNTER — HOSPITAL ENCOUNTER (OUTPATIENT)
Dept: CARDIOLOGY | Age: 61
Discharge: HOME | End: 2021-01-19
Payer: COMMERCIAL

## 2021-01-19 VITALS
RESPIRATION RATE: 20 BRPM | SYSTOLIC BLOOD PRESSURE: 167 MMHG | OXYGEN SATURATION: 99 % | DIASTOLIC BLOOD PRESSURE: 83 MMHG | HEART RATE: 68 BPM | WEIGHT: 184 LBS | HEIGHT: 70 IN | BODY MASS INDEX: 26.34 KG/M2 | TEMPERATURE: 97.1 F

## 2021-01-19 DIAGNOSIS — R94.39 ABNORMAL STRESS ECG WITH TREADMILL: Primary | ICD-10-CM

## 2021-01-19 DIAGNOSIS — R94.31 ABNORMAL EKG: ICD-10-CM

## 2021-01-19 PROCEDURE — 93016 CV STRESS TEST SUPVJ ONLY: CPT | Performed by: INTERNAL MEDICINE

## 2021-01-19 PROCEDURE — 93017 CV STRESS TEST TRACING ONLY: CPT

## 2021-01-19 PROCEDURE — 93018 CV STRESS TEST I&R ONLY: CPT | Performed by: INTERNAL MEDICINE

## 2021-01-19 NOTE — PROCEDURES
[address] [facility]. Socorro Arias [address] [place] [address]  [phone]    Exercise Stress Study       Name: [facility] Account Number:  [de-identified]      :  1960          Sex: male         Date of Study:  2021    Height: 5' 10\" (177.8 cm)          Weight: 184 lb (83.5 kg)    Ordering Provider: Heidy Greenfield          PCP: Braden Kee DO    Cardiologist: Heidy Greenfield              Interpreting Physician: Heidy Greenfield    Indication:   Detecting the presence and location of coronary artery disease    Clinical History:   Patient has no known history of coronary artery disease. Resting ECG:    ND int 158m sec, QRS int 88m sec, QT int 372m sec; HR 68 bpm  Sinus rhythm with nonspecific T wave changes    Exercise: The patient exercised using a Lyndon protocol, completing 5:00 minutes and reaching an estimated work load of 6.11 metabolic equivalents (METS). Resting HR was 68. Peak exercise heart rate was 153 ( 95% of maximum predicted heart rate for age). Baseline /83. Peak exercise /64. The blood pressure response to exercise was hypertensive      Exercise was terminated due to heart rate attained. EKG changes. The patient experienced no chest pain with exercise. Pulse oximetry was used to monitor oxygen saturation during the stress test.  The study was performed on Room Air. The resting pulse oximeter was 99%. The post stress O2 saturation seen during exercise was 98 %. Exercise ECG:   The patient demonstrated occasional PVC's during exercise. With exercise, the test was abnormal.    With exercise up to 1 mm of horizontal ST depression was noted in leads III, V4, V5 and V6 with initial changes beginning at 5 minutes into exercise, at a heart rate of 153. These changes these changes persisted 2 minutes into recovery period. The predictive value for ischemia was moderate\"}. Impression:    1. Exercise EKG was abnormal with intermediate predictive value for ischemia. 2. The patient experienced no chest pain with exercise. 3. Bacon treadmill score was 0 implying intermediate risk of acute ischemic events. 4. Exercise capacity was below average. Thank you for sending your patient to this [employer].     Electronically signed by Loc Mcleod MD on 1/19/21 at 5:02 PM EST

## 2021-01-21 ENCOUNTER — TELEPHONE (OUTPATIENT)
Dept: CARDIOLOGY CLINIC | Age: 61
End: 2021-01-21

## 2021-01-28 ENCOUNTER — TELEPHONE (OUTPATIENT)
Dept: CARDIOLOGY | Age: 61
End: 2021-01-28

## 2021-01-28 NOTE — TELEPHONE ENCOUNTER
Spoke with patient reminder of appointment on 2/2/21 at 10;30 for a Nuclear stress test instructions and COVID19 checklist reviewed patient confirmed appointment.

## 2021-02-02 ENCOUNTER — HOSPITAL ENCOUNTER (OUTPATIENT)
Dept: CARDIOLOGY | Age: 61
Discharge: HOME | End: 2021-02-02
Payer: COMMERCIAL

## 2021-02-02 VITALS
OXYGEN SATURATION: 99 % | BODY MASS INDEX: 25.77 KG/M2 | HEART RATE: 82 BPM | TEMPERATURE: 96.4 F | SYSTOLIC BLOOD PRESSURE: 172 MMHG | DIASTOLIC BLOOD PRESSURE: 83 MMHG | HEIGHT: 70 IN | WEIGHT: 180 LBS

## 2021-02-02 DIAGNOSIS — R94.39 ABNORMAL STRESS ECG WITH TREADMILL: ICD-10-CM

## 2021-02-02 LAB
LV EF: 71 %
LVEF MODALITY: NORMAL

## 2021-02-02 PROCEDURE — 78452 HT MUSCLE IMAGE SPECT MULT: CPT | Performed by: INTERNAL MEDICINE

## 2021-02-02 PROCEDURE — A9502 TC99M TETROFOSMIN: HCPCS | Performed by: INTERNAL MEDICINE

## 2021-02-02 PROCEDURE — 3430000000 HC RX DIAGNOSTIC RADIOPHARMACEUTICAL: Performed by: INTERNAL MEDICINE

## 2021-02-02 PROCEDURE — 93016 CV STRESS TEST SUPVJ ONLY: CPT | Performed by: INTERNAL MEDICINE

## 2021-02-02 PROCEDURE — 2580000003 HC RX 258: Performed by: INTERNAL MEDICINE

## 2021-02-02 PROCEDURE — 93017 CV STRESS TEST TRACING ONLY: CPT

## 2021-02-02 PROCEDURE — 78452 HT MUSCLE IMAGE SPECT MULT: CPT

## 2021-02-02 PROCEDURE — 93018 CV STRESS TEST I&R ONLY: CPT | Performed by: INTERNAL MEDICINE

## 2021-02-02 RX ORDER — SODIUM CHLORIDE 0.9 % (FLUSH) 0.9 %
10 SYRINGE (ML) INJECTION PRN
Status: DISCONTINUED | OUTPATIENT
Start: 2021-02-02 | End: 2021-02-03 | Stop reason: HOSPADM

## 2021-02-02 RX ADMIN — SODIUM CHLORIDE, PRESERVATIVE FREE 10 ML: 5 INJECTION INTRAVENOUS at 12:01

## 2021-02-02 RX ADMIN — TETROFOSMIN 35 MILLICURIE: 0.23 INJECTION, POWDER, LYOPHILIZED, FOR SOLUTION INTRAVENOUS at 12:01

## 2021-02-02 RX ADMIN — SODIUM CHLORIDE, PRESERVATIVE FREE 10 ML: 5 INJECTION INTRAVENOUS at 10:36

## 2021-02-02 RX ADMIN — TETROFOSMIN 10.2 MILLICURIE: 0.23 INJECTION, POWDER, LYOPHILIZED, FOR SOLUTION INTRAVENOUS at 10:37

## 2021-02-02 NOTE — PROCEDURES
[address] [facility] Socorro Arias [address] [place] [address]  [phone]                 Exercise Stress Nuclear Gated SPECT Study    Name: [facility] Account Number: [de-identified]    :  1960      Sex: male              Date of Study:  2021    Height: 5' 10\" (177.8 cm)  Weight: 180 lb (81.6 kg)     Ordering Provider: Janet Keller          PCP: Teresita Rodriguez DO      Cardiologist: Janet Keller                        Interpreting Physician: Janet Keller  _________________________________________________________________________________    Indication:   Detecting the presence and location of coronary artery disease    Clinical History:   Patient has no known history of coronary artery disease. Resting ECG:    OR int 166m sec, QRS int 82m sec, QT int 358m sec; HR 82 bpm  Normal sinus rhythm    Exercise: The patient exercised using a Lyndon protocol, completing 6:00 minutes and reaching an estimated work load of 7.83 metabolic equivalents (METS). Resting HR was 82. Peak exercise heart rate was 157 ( 97% of maximum predicted heart rate for age). Baseline /83. Peak exercise /70. The blood pressure response to exercise was normal      Exercise was terminated due to heart rate attained. The patient experienced no chest pain with exercise. Pulse oximetry was used to monitor oxygen saturation during the stress test.  The study was performed on Room Air. The resting pulse oximeter was 99%. The post stress O2 saturation seen during exercise was 98 %. Exercise ECG:   The patient demonstrated no arrhythmias during exercise. With exercise, there were no ST segment changes of significance at the heart rate achieved.     IF TEST NORMAL-HIGHLIGHT AND DELETE FOLLOWING SECTION:      IMAGING: Myocardial perfusion imaging was performed at rest 30-35 minutes following the intravenous injection of 10.2 mCi of (Tc-tetrofosmin) followed by 10 ml of Normal Saline. At peak exercise, the patient was injected intravenously with 35 mCi of (Tc-tetrofosmin) followed by 10 ml of Normal Saline. Gated post-stress tomographic imaging was performed 20-25 minutes after stress. FINDINGS: The overall quality of the study was good. Left ventricular cavity size was noted to be normal.    Rotational analog analysis demonstrated no patient motion or abnormal extracardiac radioactivity. A mild defect was present in the Inferior wall(s) that was  moderate size on the post stress images. The resting images are normal.     Gated SPECT left ventricular ejection fraction was calculated to be 71 %, with normal myocardial thickening and wall motion. Impression:    1. Exercise EKG was normal.  2. The patient experienced no chest pain with exercise. 3. The myocardial perfusion imaging was abnormal. The abnormality was a mild, moderate size reversible defect involving the inferior wall  4. Overall left ventricular systolic function was normal without regional wall motion abnormalities. 5. Exercise capacity was below average. 6.   Intermediate risk general exercise nuclear stress test.    Thank you for sending your patient to this [employer].      Electronically signed by Jorgito Wright MD on 2/2/21 at 7:36 PM EST

## 2021-02-03 NOTE — PROGRESS NOTES
[facility] Cardiology consult  Dr. Jorgito Wright      Reason for Consult: Post ER  Referring Physician: Kasey Amin DO     CHIEF COMPLAINT:   Chief Complaint   Patient presents with    Palpitations     Talk re:  2-D and nuclear stress test.  Patient denies any cp sob or dizziness. HISTORY OF PRESENT ILLNESS:   Patient is 62years old male with no significant past medical history, has been complaining of chest pain, shortness of breath or palpitations, patient is here to follow-up on the results of his stress test    He stated his chest pain and shortness of breath is slightly better, occasional palpitations,No lightheadedness or dizziness, no pedal edema, no PND, no orthopnea, no syncope, no presyncopal episodes. Past Medical History:   Diagnosis Date    Anxiety          Past Surgical History:   Procedure Laterality Date    EYE SURGERY Left          Current Outpatient Medications   Medication Sig Dispense Refill    metoprolol tartrate (LOPRESSOR) 25 MG tablet Take 1 tablet by mouth daily 30 tablet 0    LORazepam (ATIVAN) 0.5 MG tablet Take 1 mg by mouth every 6 hours as needed for Anxiety. No current facility-administered medications for this visit.           Allergies as of 02/04/2021    (No Known Allergies)       Social History     Socioeconomic History    Marital status: Single     Spouse name: Not on file    Number of children: Not on file    Years of education: Not on file    Highest education level: Not on file   Occupational History    Not on file   Social Needs    Financial resource strain: Not on file    Food insecurity     Worry: Not on file     Inability: Not on file    Transportation needs     Medical: Not on file     Non-medical: Not on file   Tobacco Use    Smoking status: Never Smoker    Smokeless tobacco: Never Used   Substance and Sexual Activity    Alcohol use: Yes     Comment: occasional. denies caffiene    Drug use: Never    Sexual activity: Not on file Lifestyle    Physical activity     Days per week: Not on file     Minutes per session: Not on file    Stress: Not on file   Relationships    Social connections     Talks on phone: Not on file     Gets together: Not on file     Attends [employer] service: Not on file     Active member of club or organization: Not on file     Attends meetings of clubs or organizations: Not on file     Relationship status: Not on file    Intimate partner violence     Fear of current or ex partner: Not on file     Emotionally abused: Not on file     Physically abused: Not on file     Forced sexual activity: Not on file   Other Topics Concern    Not on file   Social History Narrative    Not on file       Family History   Problem Relation Age of Onset    High Blood Pressure Mother     Heart Failure Father     Heart Surgery Father     Cancer Father        REVIEW OF SYSTEMS:   CONSTITUTIONAL:  negative for  fevers, chills, sweats and fatigue  EENT:  negative for  tinnitus, earaches, nasal congestion and epistaxis  RESPIRATORY:  negative for  dry cough, cough with sputum, dyspnea, wheezing and hemoptysis  GASTROINTESTINAL:  negative for nausea, vomiting, diarrhea, constipation, pruritus and jaundice  HEMATOLOGIC/LYMPHATIC:  negative for easy bruising, bleeding, lymphadenopathy and petechiae  ENDOCRINE:  negative for heat intolerance, cold intolerance, tremor, hair loss and diabetic symptoms including neither polyuria nor polydipsia nor blurred vision  MUSCULOSKELETAL:  negative for  myalgias, arthralgias, joint swelling, stiff joints and decreased range of motion  NEUROLOGICAL:  negative for memory problems, speech problems, visual disturbance, dysphagia, weakness and numbness    PHYSICAL EXAM:   CONSTITUTIONAL:  awake, alert, cooperative, no apparent distress, and appears stated age  HEAD:  normocepalic, without obvious abnormality, atraumatic, pink, moist mucous membranes. NECK:  Supple, symmetrical, trachea midline, no adenopathy, thyroid symmetric, not enlarged and no tenderness, skin normal  LUNGS:  No increased work of breathing, good air exchange, clear to auscultation bilaterally, no crackles or wheezing  CARDIOVASCULAR:  Normal apical impulse, regular rate and rhythm, normal S1 and S2, no S3 or S4, and no murmur noted and no JVD, no carotid bruit, no pedal edema, good carotid upstroke bilaterally. ABDOMEN:  Soft, nontender, no masses, no hepatomegaly or splenomegaly, BS+  CHEST: nontender to palpation, expands symmetrically  MUSCULOSKELETAL:  No clubbing no cyanosis. there is no redness, warmth, or swelling of the joints  full range of motion noted  NEUROLOGIC:  Alert, awake,oriented x3  SKIN:  no bruising or bleeding, normal skin color, texture, turgor and no redness, warmth, or swelling    BP (!) 148/78 (Site: Right Upper Arm, Position: Sitting, Cuff Size: Large Adult)   Pulse 100   Ht 5' 10\" (1.778 m)   Wt 180 lb (81.6 kg)   SpO2 98%   BMI 25.83 kg/m²     DATA:  I personally reviewed the visit EKG with the following interpretation:     EKG 12/22/20 Sinus rhythm, frequent PVCs, nonspecific T wave changes    ECHO: 1/15/21 Summary   No previous echo for comparison. Technically adequate study. Left ventricle size is normal.   Moderate asymmetric septal hypertrophy. Ejection fraction is visually estimated at 55%. No regional wall motion abnormalities seen. There is doppler evidence of stage II diastolic dysfunction. Physiologic and/or trace mitral regurgitation is present. Stress Test: 2/2/21   Impression:     1. Exercise EKG was normal.   2. The patient experienced no chest pain with exercise. 3. The myocardial perfusion imaging was abnormal. The abnormality   was a mild, moderate size reversible defect involving the   inferior wall   4. Overall left ventricular systolic function was normal without   regional wall motion abnormalities. 5. Exercise capacity was below average.     6.   Intermediate risk general exercise nuclear stress test.     Angiography: Not performed to date  Cardiology Labs: BMP:    Lab Results   Component Value Date     11/20/2020    K 4.5 11/20/2020     11/20/2020    CO2 26 11/20/2020    BUN 13 11/20/2020    CREATININE 1.3 11/20/2020     CMP:    Lab Results   Component Value Date     11/20/2020    K 4.5 11/20/2020     11/20/2020    CO2 26 11/20/2020    BUN 13 11/20/2020    CREATININE 1.3 11/20/2020    PROT 7.8 02/05/2020     CBC:    Lab Results   Component Value Date    WBC 7.3 11/20/2020    RBC 5.18 11/20/2020    HGB 15.4 11/20/2020    HCT 45.9 11/20/2020    MCV 88.6 11/20/2020    RDW 12.7 11/20/2020     11/20/2020     PT/INR:  No results found for: PTINR  PT/INR Warfarin:  No components found for: PTPATWAR, PTINRWAR  PTT:  No results found for: APTT  PTT Heparin:  No components found for: APTTHEP  Magnesium:  No results found for: MG  TSH:    Lab Results   Component Value Date    TSH 1.570 11/20/2020     TROPONIN:  No components found for: TROP  BNP:  No results found for: BNP  FASTING LIPID PANEL:  No results found for: CHOL, HDL, TRIG  No orders to display     I have personally reviewed the laboratory, cardiac diagnostic and radiographic testing as outlined above:    IMPRESSION:  1. Abnormal stress test: Showing reversible inferior wall defect, risk factors for CAD, patient symptomatic with exertional chest pain or shortness of breath, will need cardiac catheterization, procedure, alternatives, risks, benefits, discussed with him, is willing to proceed. 2.  Palpitations: Secondary to PVCs, as above  3. PVCs:   4.   Family history for early CAD    RECOMMENDATIONS: 1.  Cardiac catheterization: Indications, procedures, risks and benefits of cardiac catheterization were discussed with the patient with risks including, but not limited to, infection, vessel damage, bleeding, dye allergy, nephrotoxicity, dysrhythmia, MI, CVA and death as well as the potential need for emergent cardiac surgery. Patient verbalized understanding and is agreeable to proceed. 2.  Patient was strongly advised to call 911 if symptoms recur or get worse for any reason  3. Strongly advised avoid any strenuous physical activities until after cardiac cath  4. Follow-up with Dr. Genevieve Titus as scheduled  5. Follow-up with Dr. Tyler Dowling after cardiac cath    I have reviewed my findings and recommendations with patient    Electronically signed by Shahnaz Baum MD on 2/13/2021 at 12:24 PM  NOTE: This report was transcribed using voice recognition software.  Every effort was made to ensure accuracy; however, inadvertent computerized transcription errors may be present

## 2021-02-04 ENCOUNTER — OFFICE VISIT (OUTPATIENT)
Dept: CARDIOLOGY CLINIC | Age: 61
End: 2021-02-04
Payer: COMMERCIAL

## 2021-02-04 VITALS
HEIGHT: 70 IN | DIASTOLIC BLOOD PRESSURE: 78 MMHG | OXYGEN SATURATION: 98 % | SYSTOLIC BLOOD PRESSURE: 148 MMHG | WEIGHT: 180 LBS | HEART RATE: 100 BPM | BODY MASS INDEX: 25.77 KG/M2

## 2021-02-04 DIAGNOSIS — Z01.810 PREOP CARDIOVASCULAR EXAM: ICD-10-CM

## 2021-02-04 DIAGNOSIS — R00.2 PALPITATIONS: Primary | ICD-10-CM

## 2021-02-04 PROCEDURE — G8427 DOCREV CUR MEDS BY ELIG CLIN: HCPCS | Performed by: INTERNAL MEDICINE

## 2021-02-04 PROCEDURE — G8419 CALC BMI OUT NRM PARAM NOF/U: HCPCS | Performed by: INTERNAL MEDICINE

## 2021-02-04 PROCEDURE — 99213 OFFICE O/P EST LOW 20 MIN: CPT | Performed by: INTERNAL MEDICINE

## 2021-02-04 PROCEDURE — G8484 FLU IMMUNIZE NO ADMIN: HCPCS | Performed by: INTERNAL MEDICINE

## 2021-02-04 PROCEDURE — 1036F TOBACCO NON-USER: CPT | Performed by: INTERNAL MEDICINE

## 2021-02-04 PROCEDURE — 3017F COLORECTAL CA SCREEN DOC REV: CPT | Performed by: INTERNAL MEDICINE

## 2021-02-12 ENCOUNTER — HOSPITAL ENCOUNTER (OUTPATIENT)
Age: 61
Discharge: HOME | End: 2021-02-14
Payer: COMMERCIAL

## 2021-02-12 DIAGNOSIS — Z01.810 PREOP CARDIOVASCULAR EXAM: ICD-10-CM

## 2021-02-12 PROCEDURE — U0003 INFECTIOUS AGENT DETECTION BY NUCLEIC ACID (DNA OR RNA); SEVERE ACUTE RESPIRATORY SYNDROME CORONAVIRUS 2 (SARS-COV-2) (CORONAVIRUS DISEASE [COVID-19]), AMPLIFIED PROBE TECHNIQUE, MAKING USE OF HIGH THROUGHPUT TECHNOLOGIES AS DESCRIBED BY CMS-2020-01-R: HCPCS

## 2021-02-13 LAB
SARS-COV-2: NOT DETECTED
SOURCE: NORMAL

## 2021-02-16 ENCOUNTER — HOSPITAL ENCOUNTER (OUTPATIENT)
Age: 61
Discharge: HOME | End: 2021-02-18
Payer: COMMERCIAL

## 2021-02-16 ENCOUNTER — HOSPITAL ENCOUNTER (OUTPATIENT)
Age: 61
Discharge: HOME | End: 2021-02-16
Payer: COMMERCIAL

## 2021-02-16 LAB
ABO/RH: NORMAL
ANION GAP SERPL CALCULATED.3IONS-SCNC: 11 MMOL/L (ref 7–16)
ANTIBODY SCREEN: NORMAL
APTT: 30.2 SEC (ref 24.5–35.1)
BILIRUBIN URINE: NEGATIVE
BLOOD, URINE: NEGATIVE
BUN BLDV-MCNC: 13 MG/DL (ref 6–20)
CALCIUM SERPL-MCNC: 9.6 MG/DL (ref 8.6–10.2)
CHLORIDE BLD-SCNC: 103 MMOL/L (ref 98–107)
CLARITY: CLEAR
CO2: 28 MMOL/L (ref 22–29)
COLOR: YELLOW
CREAT SERPL-MCNC: 1.2 MG/DL (ref 0.7–1.2)
GFR AFRICAN AMERICAN: >60
GFR NON-AFRICAN AMERICAN: >60 ML/MIN/1.73
GLUCOSE BLD-MCNC: 162 MG/DL (ref 74–99)
GLUCOSE URINE: 100 MG/DL
HCT VFR BLD CALC: 48 % (ref 37–54)
HEMOGLOBIN: 15.6 G/DL (ref 12.5–16.5)
INR BLD: 1
KETONES, URINE: NEGATIVE MG/DL
LEUKOCYTE ESTERASE, URINE: NEGATIVE
MCH RBC QN AUTO: 29.3 PG (ref 26–35)
MCHC RBC AUTO-ENTMCNC: 32.5 % (ref 32–34.5)
MCV RBC AUTO: 90.2 FL (ref 80–99.9)
NITRITE, URINE: NEGATIVE
PDW BLD-RTO: 12.6 FL (ref 11.5–15)
PH UA: 6 (ref 5–9)
PLATELET # BLD: 212 E9/L (ref 130–450)
PMV BLD AUTO: 10.7 FL (ref 7–12)
POTASSIUM SERPL-SCNC: 4.1 MMOL/L (ref 3.5–5)
PROTEIN UA: NEGATIVE MG/DL
PROTHROMBIN TIME: 11.6 SEC (ref 9.3–12.4)
RBC # BLD: 5.32 E12/L (ref 3.8–5.8)
SODIUM BLD-SCNC: 142 MMOL/L (ref 132–146)
SPECIFIC GRAVITY UA: 1.02 (ref 1–1.03)
UROBILINOGEN, URINE: 0.2 E.U./DL
WBC # BLD: 7.3 E9/L (ref 4.5–11.5)

## 2021-02-16 PROCEDURE — 85027 COMPLETE CBC AUTOMATED: CPT

## 2021-02-16 PROCEDURE — 36415 COLL VENOUS BLD VENIPUNCTURE: CPT

## 2021-02-16 PROCEDURE — 85610 PROTHROMBIN TIME: CPT

## 2021-02-16 PROCEDURE — 80048 BASIC METABOLIC PNL TOTAL CA: CPT

## 2021-02-16 PROCEDURE — 85730 THROMBOPLASTIN TIME PARTIAL: CPT

## 2021-02-16 PROCEDURE — 81003 URINALYSIS AUTO W/O SCOPE: CPT

## 2021-02-18 ENCOUNTER — HOSPITAL ENCOUNTER (OUTPATIENT)
Dept: CARDIAC CATH/INVASIVE PROCEDURES | Age: 61
Discharge: HOME | End: 2021-02-18
Payer: COMMERCIAL

## 2021-02-18 VITALS
SYSTOLIC BLOOD PRESSURE: 141 MMHG | HEIGHT: 70 IN | DIASTOLIC BLOOD PRESSURE: 100 MMHG | BODY MASS INDEX: 26.48 KG/M2 | TEMPERATURE: 98.9 F | RESPIRATION RATE: 20 BRPM | OXYGEN SATURATION: 99 % | HEART RATE: 111 BPM | WEIGHT: 185 LBS

## 2021-02-18 PROCEDURE — C1894 INTRO/SHEATH, NON-LASER: HCPCS

## 2021-02-18 PROCEDURE — C1769 GUIDE WIRE: HCPCS

## 2021-02-18 PROCEDURE — 2500000003 HC RX 250 WO HCPCS

## 2021-02-18 PROCEDURE — 2709999900 HC NON-CHARGEABLE SUPPLY

## 2021-02-18 PROCEDURE — C1725 CATH, TRANSLUMIN NON-LASER: HCPCS

## 2021-02-18 PROCEDURE — 93454 CORONARY ARTERY ANGIO S&I: CPT | Performed by: INTERNAL MEDICINE

## 2021-02-18 PROCEDURE — 6360000002 HC RX W HCPCS

## 2021-02-18 PROCEDURE — 6370000000 HC RX 637 (ALT 250 FOR IP): Performed by: INTERNAL MEDICINE

## 2021-02-18 RX ORDER — SODIUM CHLORIDE 0.9 % (FLUSH) 0.9 %
10 SYRINGE (ML) INJECTION PRN
Status: CANCELLED | OUTPATIENT
Start: 2021-02-18

## 2021-02-18 RX ORDER — ASPIRIN 325 MG
325 TABLET ORAL ONCE
Status: COMPLETED | OUTPATIENT
Start: 2021-02-18 | End: 2021-02-18

## 2021-02-18 RX ORDER — ACETAMINOPHEN 325 MG/1
650 TABLET ORAL EVERY 4 HOURS PRN
Status: CANCELLED | OUTPATIENT
Start: 2021-02-18

## 2021-02-18 RX ORDER — SODIUM CHLORIDE 0.9 % (FLUSH) 0.9 %
10 SYRINGE (ML) INJECTION EVERY 12 HOURS SCHEDULED
Status: CANCELLED | OUTPATIENT
Start: 2021-02-18

## 2021-02-18 RX ADMIN — ASPIRIN 325 MG: 325 TABLET ORAL at 08:50

## 2021-02-18 NOTE — PROCEDURES
[address]                  [address] [id number],  [facility]                            CARDIAC CATHETERIZATION    PATIENT NAME: Aurelia Mcdermott                        :        1960  MED REC NO:   [id number]                            ROOM:  ACCOUNT NO:   [de-identified]                           ADMIT DATE: 2021  PROVIDER:     Jitendra Arana MD    DATE OF PROCEDURE:  2021    PROCEDURES:  1. Coronary angiography. 2.  Conscious sedation using Versed and fentanyl. Indication #4, score of 7. INDICATION FOR PROCEDURE:  The patient is 62years old male with a  history of palpitations, who had abnormal regular exercise stress test  with subsequent abnormal nuclear exercise stress test, was brought to  the cath lab for further evaluation. DESCRIPTION OF PROCEDURE:  After the appropriate informed consent, the  right wrist area was prepped and draped in the usual sterile fashion. A  timeout was called. The right wrist area was locally anesthetized with  2 mL of 2% lidocaine. A 21-gauge needle was used to access the right  radial artery. A 6-Czech introducer sheath was used to cannulate the  right radial artery. A 6-Czech JL3.5 and 6-Czech JR4 catheters were  used for coronary angiography in multiple projections. ANGIOGRAPHIC FINDINGS:  The left main coronary artery arises normally from the left sinus of  Valsalva. It is a mid-size vessel that trifurcates into left anterior  descending artery, left circumflex artery, and ramus intermedius artery. The left anterior descending artery is a large vessel, long vessel,  gives off a large diagonal branch. The LAD and its branches have no  CAD. The ramus intermedius artery is a mid-size vessel without any CAD. The left circumflex artery is a mid-size vessel that gives off 3 OM  branches. The left circumflex artery and branches have no CAD.     The right coronary artery arises normally from the right sinus of  Valsalva. It is a large vessel, dominant circulation without CAD. At the end of the procedure, the catheter and the wire were pulled out  from the body, the sheath was pulled out from the body, and a Vasc Band  was applied to the right wrist area with effective hemostasis. COMPLICATIONS:  None. ESTIMATED TOTAL BLOOD LOSS:  15-20 mL. MEDICATIONS USED DURING THE PROCEDURE:  We used intraarterial verapamil  to prevent vasospasm. We used intraarterial heparin for  anticoagulation. CONSCIOUS SEDATION:  We used Versed and fentanyl for conscious sedation. First dose was given at 10:56, procedure ended at 11:15. There was 21  minutes of direct face-to-face supervision during conscious sedation  administration. Total contrast used was 70 mL of Optiray. Total fluoroscopy time was 3.3 minutes. IMPRESSION:  1. Angiographically normal coronary arteries. 2.  False-positive stress test.    RECOMMENDATIONS:  1. Continue current treatment. 2.  The patient will be observed for 2 hours and discharged home if he  meets discharge criteria.         Aura Johnson MD    D: 02/18/2021 11:37:00       T: 02/18/2021 13:33:33     [email or web address]  Job#: [id number]     Doc#: [id number]    CC:

## 2021-02-19 ENCOUNTER — TELEPHONE (OUTPATIENT)
Dept: CARDIOLOGY CLINIC | Age: 61
End: 2021-02-19

## 2023-08-18 ENCOUNTER — HOSPITAL ENCOUNTER (EMERGENCY)
Age: 63
Discharge: HOME | End: 2023-08-18
Attending: STUDENT IN AN ORGANIZED HEALTH CARE EDUCATION/TRAINING PROGRAM
Payer: COMMERCIAL

## 2023-08-18 ENCOUNTER — APPOINTMENT (OUTPATIENT)
Dept: CT IMAGING | Age: 63
End: 2023-08-18
Payer: COMMERCIAL

## 2023-08-18 ENCOUNTER — APPOINTMENT (OUTPATIENT)
Dept: GENERAL RADIOLOGY | Age: 63
End: 2023-08-18
Payer: COMMERCIAL

## 2023-08-18 VITALS
OXYGEN SATURATION: 97 % | HEART RATE: 89 BPM | DIASTOLIC BLOOD PRESSURE: 82 MMHG | TEMPERATURE: 99.4 F | WEIGHT: 185 LBS | SYSTOLIC BLOOD PRESSURE: 164 MMHG | BODY MASS INDEX: 26.54 KG/M2 | RESPIRATION RATE: 17 BRPM

## 2023-08-18 DIAGNOSIS — R00.0 TACHYCARDIA: ICD-10-CM

## 2023-08-18 DIAGNOSIS — J06.9 UPPER RESPIRATORY TRACT INFECTION, UNSPECIFIED TYPE: ICD-10-CM

## 2023-08-18 DIAGNOSIS — U07.1 COVID-19: Primary | ICD-10-CM

## 2023-08-18 LAB
ALBUMIN SERPL-MCNC: 4.7 G/DL (ref 3.5–5.2)
ALP SERPL-CCNC: 55 U/L (ref 40–129)
ALT SERPL-CCNC: 17 U/L (ref 0–40)
ANION GAP SERPL CALCULATED.3IONS-SCNC: 15 MMOL/L (ref 7–16)
AST SERPL-CCNC: 21 U/L (ref 0–39)
B PARAP IS1001 DNA NPH QL NAA+NON-PROBE: NOT DETECTED
B PERT DNA SPEC QL NAA+PROBE: NOT DETECTED
BASOPHILS # BLD: 0.03 K/UL (ref 0–0.2)
BASOPHILS NFR BLD: 0 % (ref 0–2)
BILIRUB SERPL-MCNC: 0.6 MG/DL (ref 0–1.2)
BUN SERPL-MCNC: 11 MG/DL (ref 6–23)
C PNEUM DNA NPH QL NAA+NON-PROBE: NOT DETECTED
CALCIUM SERPL-MCNC: 9.7 MG/DL (ref 8.6–10.2)
CHLORIDE SERPL-SCNC: 100 MMOL/L (ref 98–107)
CO2 SERPL-SCNC: 21 MMOL/L (ref 22–29)
CREAT SERPL-MCNC: 1.3 MG/DL (ref 0.7–1.2)
EOSINOPHIL # BLD: 0.01 K/UL (ref 0.05–0.5)
EOSINOPHILS RELATIVE PERCENT: 0 % (ref 0–6)
ERYTHROCYTE [DISTWIDTH] IN BLOOD BY AUTOMATED COUNT: 12.6 % (ref 11.5–15)
FLUAV RNA NPH QL NAA+NON-PROBE: NOT DETECTED
FLUBV RNA NPH QL NAA+NON-PROBE: NOT DETECTED
GFR SERPL CREATININE-BSD FRML MDRD: >60 ML/MIN/1.73M2
GLUCOSE SERPL-MCNC: 130 MG/DL (ref 74–99)
HADV DNA NPH QL NAA+NON-PROBE: NOT DETECTED
HCOV 229E RNA NPH QL NAA+NON-PROBE: NOT DETECTED
HCOV HKU1 RNA NPH QL NAA+NON-PROBE: NOT DETECTED
HCOV NL63 RNA NPH QL NAA+NON-PROBE: NOT DETECTED
HCOV OC43 RNA NPH QL NAA+NON-PROBE: NOT DETECTED
HCT VFR BLD AUTO: 44.5 % (ref 37–54)
HGB BLD-MCNC: 15 G/DL (ref 12.5–16.5)
HMPV RNA NPH QL NAA+NON-PROBE: NOT DETECTED
HPIV1 RNA NPH QL NAA+NON-PROBE: NOT DETECTED
HPIV2 RNA NPH QL NAA+NON-PROBE: NOT DETECTED
HPIV3 RNA NPH QL NAA+NON-PROBE: NOT DETECTED
HPIV4 RNA NPH QL NAA+NON-PROBE: NOT DETECTED
IMM GRANULOCYTES # BLD AUTO: 0.06 K/UL (ref 0–0.58)
IMM GRANULOCYTES NFR BLD: 1 % (ref 0–5)
INFLUENZA A BY PCR: NOT DETECTED
INFLUENZA B BY PCR: NOT DETECTED
LACTATE BLDV-SCNC: 2.4 MMOL/L (ref 0.5–1.9)
LACTATE BLDV-SCNC: 2.9 MMOL/L (ref 0.5–1.9)
LYMPHOCYTES NFR BLD: 1.03 K/UL (ref 1.5–4)
LYMPHOCYTES RELATIVE PERCENT: 9 % (ref 20–42)
M PNEUMO DNA NPH QL NAA+NON-PROBE: NOT DETECTED
MCH RBC QN AUTO: 29.5 PG (ref 26–35)
MCHC RBC AUTO-ENTMCNC: 33.7 G/DL (ref 32–34.5)
MCV RBC AUTO: 87.6 FL (ref 80–99.9)
MONOCYTES NFR BLD: 0.98 K/UL (ref 0.1–0.95)
MONOCYTES NFR BLD: 9 % (ref 2–12)
NEUTROPHILS NFR BLD: 82 % (ref 43–80)
NEUTS SEG NFR BLD: 9.35 K/UL (ref 1.8–7.3)
PLATELET # BLD AUTO: 187 K/UL (ref 130–450)
PMV BLD AUTO: 11.2 FL (ref 7–12)
POTASSIUM SERPL-SCNC: 4 MMOL/L (ref 3.5–5)
PROT SERPL-MCNC: 7.6 G/DL (ref 6.4–8.3)
RBC # BLD AUTO: 5.08 M/UL (ref 3.8–5.8)
RSV RNA NPH QL NAA+NON-PROBE: NOT DETECTED
RV+EV RNA NPH QL NAA+NON-PROBE: NOT DETECTED
SARS-COV-2 RDRP RESP QL NAA+PROBE: NOT DETECTED
SARS-COV-2 RNA NPH QL NAA+NON-PROBE: DETECTED
SODIUM SERPL-SCNC: 136 MMOL/L (ref 132–146)
SPECIMEN DESCRIPTION: ABNORMAL
SPECIMEN DESCRIPTION: NORMAL
TROPONIN I SERPL HS-MCNC: <6 NG/L (ref 0–11)
WBC OTHER # BLD: 11.5 K/UL (ref 4.5–11.5)

## 2023-08-18 PROCEDURE — 2580000003 HC RX 258: Performed by: STUDENT IN AN ORGANIZED HEALTH CARE EDUCATION/TRAINING PROGRAM

## 2023-08-18 PROCEDURE — 6360000004 HC RX CONTRAST MEDICATION: Performed by: RADIOLOGY

## 2023-08-18 PROCEDURE — 93005 ELECTROCARDIOGRAM TRACING: CPT | Performed by: STUDENT IN AN ORGANIZED HEALTH CARE EDUCATION/TRAINING PROGRAM

## 2023-08-18 PROCEDURE — 85025 COMPLETE CBC W/AUTO DIFF WBC: CPT

## 2023-08-18 PROCEDURE — 80053 COMPREHEN METABOLIC PANEL: CPT

## 2023-08-18 PROCEDURE — 0202U NFCT DS 22 TRGT SARS-COV-2: CPT

## 2023-08-18 PROCEDURE — 84484 ASSAY OF TROPONIN QUANT: CPT

## 2023-08-18 PROCEDURE — 96361 HYDRATE IV INFUSION ADD-ON: CPT

## 2023-08-18 PROCEDURE — 71275 CT ANGIOGRAPHY CHEST: CPT

## 2023-08-18 PROCEDURE — 83605 ASSAY OF LACTIC ACID: CPT

## 2023-08-18 PROCEDURE — 99285 EMERGENCY DEPT VISIT HI MDM: CPT

## 2023-08-18 PROCEDURE — 96360 HYDRATION IV INFUSION INIT: CPT

## 2023-08-18 PROCEDURE — 87502 INFLUENZA DNA AMP PROBE: CPT

## 2023-08-18 PROCEDURE — 87635 SARS-COV-2 COVID-19 AMP PRB: CPT

## 2023-08-18 PROCEDURE — 71045 X-RAY EXAM CHEST 1 VIEW: CPT

## 2023-08-18 PROCEDURE — 87040 BLOOD CULTURE FOR BACTERIA: CPT

## 2023-08-18 PROCEDURE — 6370000000 HC RX 637 (ALT 250 FOR IP): Performed by: STUDENT IN AN ORGANIZED HEALTH CARE EDUCATION/TRAINING PROGRAM

## 2023-08-18 RX ORDER — SODIUM CHLORIDE 9 MG/ML
INJECTION, SOLUTION INTRAVENOUS PRN
Status: DISCONTINUED | OUTPATIENT
Start: 2023-08-18 | End: 2023-08-18 | Stop reason: HOSPADM

## 2023-08-18 RX ORDER — 0.9 % SODIUM CHLORIDE 0.9 %
1000 INTRAVENOUS SOLUTION INTRAVENOUS ONCE
Status: COMPLETED | OUTPATIENT
Start: 2023-08-18 | End: 2023-08-18

## 2023-08-18 RX ORDER — ACETAMINOPHEN 500 MG
1000 TABLET ORAL ONCE
Status: COMPLETED | OUTPATIENT
Start: 2023-08-18 | End: 2023-08-18

## 2023-08-18 RX ORDER — SODIUM CHLORIDE 0.9 % (FLUSH) 0.9 %
5-40 SYRINGE (ML) INJECTION EVERY 12 HOURS SCHEDULED
Status: DISCONTINUED | OUTPATIENT
Start: 2023-08-18 | End: 2023-08-18 | Stop reason: HOSPADM

## 2023-08-18 RX ORDER — SODIUM CHLORIDE 0.9 % (FLUSH) 0.9 %
5-40 SYRINGE (ML) INJECTION PRN
Status: DISCONTINUED | OUTPATIENT
Start: 2023-08-18 | End: 2023-08-18 | Stop reason: HOSPADM

## 2023-08-18 RX ADMIN — IOPAMIDOL 70 ML: 755 INJECTION, SOLUTION INTRAVENOUS at 17:36

## 2023-08-18 RX ADMIN — SODIUM CHLORIDE 1000 ML: 9 INJECTION, SOLUTION INTRAVENOUS at 15:07

## 2023-08-18 RX ADMIN — ACETAMINOPHEN 1000 MG: 500 TABLET ORAL at 16:19

## 2023-08-18 RX ADMIN — SODIUM CHLORIDE 1000 ML: 9 INJECTION, SOLUTION INTRAVENOUS at 13:49

## 2023-08-18 ASSESSMENT — PAIN SCALES - GENERAL
PAINLEVEL_OUTOF10: 8
PAINLEVEL_OUTOF10: 6

## 2023-08-18 ASSESSMENT — PAIN DESCRIPTION - LOCATION
LOCATION: HEAD
LOCATION: HEAD

## 2023-08-18 ASSESSMENT — PAIN - FUNCTIONAL ASSESSMENT: PAIN_FUNCTIONAL_ASSESSMENT: 0-10

## 2023-08-18 NOTE — ED PROVIDER NOTES
[address]      Pt Name: Yokasta Bustillos  MRN: [id number]  [facility] 1960  Date of evaluation: 8/18/2023  Provider: Teresita Bosch DO  PCP: Linda Tovar DO  Note Started: 1:21 PM EDT 8/18/23    CHIEF COMPLAINT       Chief Complaint   Patient presents with    Palpitations     Pt c/o palpitations that started around 0200. HISTORY OF PRESENT ILLNESS: 1 or more Elements   History From: Patient  Limitations to history : None    Yokasta Bustillos is a 59 y.o. male who presents to the ED due to tachycardia. Patient states that over the past several days he felt like he is coming down with an illness. He says that he has been increasingly fatigued and mildly short of breath with a moderate cough. He states cough is nonproductive in nature. Says that yesterday afternoon he began to have a sensation of palpitations like his heart was racing. He says that he does have a history of anxiety and proceeded to take 1.5 mg of Ativan which did not relieve his symptoms. Denies any associated chest pain with his symptoms. States he has had mild sinus congestion and a headache as well. Denies any abdominal pain, constipation, diarrhea, dysuria, urinary urgency, frequency or hematuria. Patient endorses subjective chills at home without any documented fevers. Nursing Notes were all reviewed and agreed with or any disagreements were addressed in the HPI. REVIEW OF SYSTEMS :    Positives and Pertinent negatives as per HPI. SURGICAL HISTORY     Past Surgical History:   Procedure Laterality Date    EYE SURGERY Left        CURRENTMEDICATIONS       Discharge Medication List as of 8/18/2023  7:45 PM        CONTINUE these medications which have NOT CHANGED    Details   LORazepam (ATIVAN) 0.5 MG tablet Take 2 tablets by mouth 2 times daily as needed for Anxiety. Historical Med             ALLERGIES     Patient has no known 08/18/23 1600 08/18/23 1800 08/18/23 1945   BP:  (!) 159/86 (!) 173/84 (!) 164/82   Pulse: (!) 101 (!) 105 97 89   Resp: 23 20 15 17   Temp:       TempSrc:       SpO2: 97% 97% 95% 97%   Weight:           Patient was given the following medications:  Medications   0.9 % sodium chloride IV bolus 1,000 mL (0 mLs IntraVENous Stopped 8/18/23 1507)   sodium chloride 0.9 % bolus 1,000 mL (0 mLs IntraVENous Stopped 8/18/23 1620)   acetaminophen (TYLENOL) tablet 1,000 mg (1,000 mg Oral Given 8/18/23 1619)   iopamidol (ISOVUE-370) 76 % injection 70 mL (70 mLs IntraVENous Given 8/18/23 1736)         Is this patient to be included in the SEP-1 core measure due to severe sepsis or septic shock? No Exclusion criteria - the patient is NOT to be included for SEP-1 Core Measure due to: Infection is not suspected        Medical Decision Making/Differential Diagnosis:    CC/HPI Summary, Social Determinants of health, Records Reviewed, DDx, testing done/not done, ED Course, Reassessment, disposition considerations/shared decision making with patient, consults, disposition:      ED Course as of 08/19/23 0619   Fri Aug 18, 2023   1410 58 yo male presenting with palpitations. Patient has a had lightheadedness for the past couple days. He has had chills and subjective fevers at home. He complains of a dry feeling in his throat and a cough. He states last night, he felt his heart racing, and took an Ativan because of anxiety, but it did not help his heart racing  He complains of mild headache. He has not take any medications at home prior to arrival.  He denies any emesis, diarrhea, or dysuria. On my exam, patient is resting in bed comfortably. Tachycardic, but lungs clear to auscultation bilateral.  No meningeal signs. Abdomen is soft and nontender. Plan for EKG, labs, chest x-ray, fluids.  [AP]   0922 This patient signed out to me by my co-resident he came to the emergency department for the concerns of feeling generally ill with

## 2023-08-20 LAB
EKG ATRIAL RATE: 134 BPM
EKG P AXIS: 68 DEGREES
EKG P-R INTERVAL: 162 MS
EKG Q-T INTERVAL: 286 MS
EKG QRS DURATION: 74 MS
EKG QTC CALCULATION (BAZETT): 427 MS
EKG R AXIS: 74 DEGREES
EKG T AXIS: 12 DEGREES
EKG VENTRICULAR RATE: 134 BPM
MICROORGANISM SPEC CULT: NORMAL
MICROORGANISM SPEC CULT: NORMAL
SERVICE CMNT-IMP: NORMAL
SERVICE CMNT-IMP: NORMAL
SPECIMEN DESCRIPTION: NORMAL
SPECIMEN DESCRIPTION: NORMAL

## 2023-08-20 PROCEDURE — 93010 ELECTROCARDIOGRAM REPORT: CPT | Performed by: INTERNAL MEDICINE

## 2023-08-23 LAB
MICROORGANISM SPEC CULT: NORMAL
MICROORGANISM SPEC CULT: NORMAL
SERVICE CMNT-IMP: NORMAL
SERVICE CMNT-IMP: NORMAL
SPECIMEN DESCRIPTION: NORMAL
SPECIMEN DESCRIPTION: NORMAL

## 2024-07-05 LAB
ALBUMIN SERPL-MCNC: 4.7 G/DL (ref 3.5–5.2)
ALP SERPL-CCNC: 50 U/L (ref 40–129)
ALT SERPL-CCNC: 10 U/L (ref 0–40)
ANION GAP SERPL CALCULATED.3IONS-SCNC: 13 MMOL/L (ref 7–16)
AST SERPL-CCNC: 26 U/L (ref 0–39)
BILIRUB SERPL-MCNC: 0.4 MG/DL (ref 0–1.2)
BUN SERPL-MCNC: 14 MG/DL (ref 6–23)
CALCIUM SERPL-MCNC: 9.9 MG/DL (ref 8.6–10.2)
CHLORIDE SERPL-SCNC: 101 MMOL/L (ref 98–107)
CHOLEST SERPL-MCNC: 161 MG/DL
CO2 SERPL-SCNC: 24 MMOL/L (ref 22–29)
CREAT SERPL-MCNC: 1.2 MG/DL (ref 0.7–1.2)
ERYTHROCYTE [DISTWIDTH] IN BLOOD BY AUTOMATED COUNT: 12.4 % (ref 11.5–15)
GFR, ESTIMATED: 71 ML/MIN/1.73M2
GLUCOSE SERPL-MCNC: 185 MG/DL (ref 74–99)
HBA1C MFR BLD: 5.9 % (ref 4–5.6)
HCT VFR BLD AUTO: 46.4 % (ref 37–54)
HDLC SERPL-MCNC: 54 MG/DL
HGB BLD-MCNC: 15.8 G/DL (ref 12.5–16.5)
LDLC SERPL CALC-MCNC: 93 MG/DL
MCH RBC QN AUTO: 30.3 PG (ref 26–35)
MCHC RBC AUTO-ENTMCNC: 34.1 G/DL (ref 32–34.5)
MCV RBC AUTO: 88.9 FL (ref 80–99.9)
PLATELET # BLD AUTO: 236 K/UL (ref 130–450)
PMV BLD AUTO: 11.7 FL (ref 7–12)
POTASSIUM SERPL-SCNC: 4.2 MMOL/L (ref 3.5–5)
PROT SERPL-MCNC: 7.5 G/DL (ref 6.4–8.3)
PSA SERPL-MCNC: 1.16 NG/ML (ref 0–4)
RBC # BLD AUTO: 5.22 M/UL (ref 3.8–5.8)
SODIUM SERPL-SCNC: 138 MMOL/L (ref 132–146)
TRIGL SERPL-MCNC: 71 MG/DL
TSH SERPL DL<=0.05 MIU/L-ACNC: 2.33 UIU/ML (ref 0.27–4.2)
VLDLC SERPL CALC-MCNC: 14 MG/DL
WBC OTHER # BLD: 7.8 K/UL (ref 4.5–11.5)

## 2024-08-25 ENCOUNTER — HOSPITAL ENCOUNTER (EMERGENCY)
Age: 64
Discharge: HOME | End: 2024-08-25
Attending: EMERGENCY MEDICINE
Payer: COMMERCIAL

## 2024-08-25 ENCOUNTER — APPOINTMENT (OUTPATIENT)
Dept: CT IMAGING | Age: 64
End: 2024-08-25
Payer: COMMERCIAL

## 2024-08-25 VITALS
WEIGHT: 175 LBS | RESPIRATION RATE: 20 BRPM | TEMPERATURE: 97.8 F | BODY MASS INDEX: 25.11 KG/M2 | HEART RATE: 91 BPM | OXYGEN SATURATION: 98 % | DIASTOLIC BLOOD PRESSURE: 96 MMHG | SYSTOLIC BLOOD PRESSURE: 169 MMHG

## 2024-08-25 DIAGNOSIS — G43.109 OCULAR MIGRAINE: Primary | ICD-10-CM

## 2024-08-25 PROCEDURE — 70450 CT HEAD/BRAIN W/O DYE: CPT

## 2024-08-25 PROCEDURE — 99284 EMERGENCY DEPT VISIT MOD MDM: CPT

## 2024-08-25 ASSESSMENT — VISUAL ACUITY
OD: 20:20
OU: 20:20

## 2024-08-25 ASSESSMENT — LIFESTYLE VARIABLES: HOW OFTEN DO YOU HAVE A DRINK CONTAINING ALCOHOL: MONTHLY OR LESS

## 2024-08-25 ASSESSMENT — PAIN - FUNCTIONAL ASSESSMENT: PAIN_FUNCTIONAL_ASSESSMENT: NONE - DENIES PAIN

## 2024-08-25 NOTE — ED PROVIDER NOTES
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  Has had 5 or 6 months of intermittent vision changes sometimes associated with headache, believes that he is having ocular migraines, has been seen by his doctor for this and has imaging of his head ordered however he is having insurance issues so he has not had images yet.  He has had multiple episodes over the last 5 or 6 months.  He had another episodes today, actually had 2 today.  Describes where he will get a blurry middle vision and cannot see anything for a little bit with blurry vision bilaterally followed by odd lights and kaleidoscope and waterfall vision the last 4 several minutes afterwards and then slowly resolves.  No seizures, no syncope, no lightheadedness, no dizziness, no facial droop or speech difficulty no extremity numbness, tingling, paresthesia or weakness.  No current symptoms but because he had 2 of them today and normally only has 1 in the day he wanted checked out.  He admits to being very nervous and anxious with a history of anxiety.    Review of Systems   Constitutional:  Negative for chills, diaphoresis, fatigue and fever.   HENT:  Negative for congestion, sinus pressure and sore throat.    Eyes:  Positive for visual disturbance. Negative for photophobia, pain, discharge and redness.   Respiratory:  Negative for cough, chest tightness, shortness of breath and wheezing.    Cardiovascular:  Negative for chest pain, palpitations and leg swelling.   Gastrointestinal:  Negative for abdominal pain, diarrhea, nausea and vomiting.   Genitourinary:  Negative for dysuria, flank pain, frequency and urgency.   Musculoskeletal:  Negative for arthralgias, back pain, gait problem, joint swelling, myalgias, neck pain and neck stiffness.   Skin:  Negative for rash and wound.   Neurological: